# Patient Record
Sex: FEMALE | Race: WHITE
[De-identification: names, ages, dates, MRNs, and addresses within clinical notes are randomized per-mention and may not be internally consistent; named-entity substitution may affect disease eponyms.]

---

## 2017-01-04 ENCOUNTER — HOSPITAL ENCOUNTER (OUTPATIENT)
Dept: HOSPITAL 45 - C.LAB | Age: 77
Discharge: HOME | End: 2017-01-04
Payer: COMMERCIAL

## 2017-01-04 DIAGNOSIS — I10: Primary | ICD-10-CM

## 2017-01-04 DIAGNOSIS — M79.2: ICD-10-CM

## 2017-01-04 LAB
ALT SERPL-CCNC: 23 U/L (ref 12–78)
ANION GAP SERPL CALC-SCNC: 6 MMOL/L (ref 3–11)
AST SERPL-CCNC: 14 U/L (ref 15–37)
BASOPHILS # BLD: 0.02 K/UL (ref 0–0.2)
BASOPHILS NFR BLD: 0.4 %
BUN SERPL-MCNC: 16 MG/DL (ref 7–18)
BUN/CREAT SERPL: 16.5 (ref 10–20)
CALCIUM SERPL-MCNC: 9.1 MG/DL (ref 8.5–10.1)
CHLORIDE SERPL-SCNC: 107 MMOL/L (ref 98–107)
CO2 SERPL-SCNC: 29 MMOL/L (ref 21–32)
COMPLETE: YES
CREAT SERPL-MCNC: 0.96 MG/DL (ref 0.6–1.2)
EOSINOPHIL NFR BLD AUTO: 245 K/UL (ref 130–400)
GLUCOSE SERPL-MCNC: 96 MG/DL (ref 70–99)
HCT VFR BLD CALC: 39.1 % (ref 37–47)
IG%: 0.2 %
IMM GRANULOCYTES NFR BLD AUTO: 31.4 %
LYMPHOCYTES # BLD: 1.7 K/UL (ref 1.2–3.4)
MCH RBC QN AUTO: 31.5 PG (ref 25–34)
MCHC RBC AUTO-ENTMCNC: 34.3 G/DL (ref 32–36)
MCV RBC AUTO: 92 FL (ref 80–100)
MONOCYTES NFR BLD: 8.7 %
NEUTROPHILS # BLD AUTO: 7.7 %
NEUTROPHILS NFR BLD AUTO: 51.6 %
PMV BLD AUTO: 9.1 FL (ref 7.4–10.4)
POTASSIUM SERPL-SCNC: 4.1 MMOL/L (ref 3.5–5.1)
RBC # BLD AUTO: 4.25 M/UL (ref 4.2–5.4)
SODIUM SERPL-SCNC: 142 MMOL/L (ref 136–145)
WBC # BLD AUTO: 5.42 K/UL (ref 4.8–10.8)

## 2017-01-27 ENCOUNTER — HOSPITAL ENCOUNTER (OUTPATIENT)
Dept: HOSPITAL 45 - C.LAB | Age: 77
End: 2017-01-27
Payer: COMMERCIAL

## 2017-01-27 DIAGNOSIS — M79.1: Primary | ICD-10-CM

## 2017-01-27 LAB
FERRITIN SERPL-MCNC: 76.9 NG/ML (ref 8–388)
MAGNESIUM SERPL-MCNC: 2.2 MG/DL (ref 1.8–2.4)
TSH SERPL-ACNC: 1.63 UIU/ML (ref 0.3–4.5)

## 2017-05-08 ENCOUNTER — HOSPITAL ENCOUNTER (OUTPATIENT)
Dept: HOSPITAL 45 - C.RAD | Age: 77
Discharge: HOME | End: 2017-05-08
Payer: COMMERCIAL

## 2017-05-08 DIAGNOSIS — R05: Primary | ICD-10-CM

## 2017-05-08 LAB
BASOPHILS # BLD: 0.02 K/UL (ref 0–0.2)
BASOPHILS NFR BLD: 0.3 %
COMPLETE: YES
EOSINOPHIL NFR BLD AUTO: 188 K/UL (ref 130–400)
HCT VFR BLD CALC: 39.4 % (ref 37–47)
IG%: 0.3 %
IMM GRANULOCYTES NFR BLD AUTO: 20 %
LYMPHOCYTES # BLD: 1.44 K/UL (ref 1.2–3.4)
MCH RBC QN AUTO: 31.3 PG (ref 25–34)
MCHC RBC AUTO-ENTMCNC: 33.8 G/DL (ref 32–36)
MCV RBC AUTO: 92.7 FL (ref 80–100)
MONOCYTES NFR BLD: 9 %
NEUTROPHILS # BLD AUTO: 5.5 %
NEUTROPHILS NFR BLD AUTO: 64.9 %
PMV BLD AUTO: 8.7 FL (ref 7.4–10.4)
RBC # BLD AUTO: 4.25 M/UL (ref 4.2–5.4)
WBC # BLD AUTO: 7.21 K/UL (ref 4.8–10.8)

## 2017-06-28 ENCOUNTER — HOSPITAL ENCOUNTER (EMERGENCY)
Dept: HOSPITAL 45 - C.EDB | Age: 77
Discharge: HOME | End: 2017-06-28
Payer: COMMERCIAL

## 2017-06-28 VITALS
WEIGHT: 145.06 LBS | BODY MASS INDEX: 25.7 KG/M2 | WEIGHT: 145.06 LBS | HEIGHT: 62.99 IN | BODY MASS INDEX: 25.7 KG/M2 | HEIGHT: 62.99 IN

## 2017-06-28 VITALS — OXYGEN SATURATION: 97 % | DIASTOLIC BLOOD PRESSURE: 107 MMHG | SYSTOLIC BLOOD PRESSURE: 190 MMHG | HEART RATE: 100 BPM

## 2017-06-28 VITALS — TEMPERATURE: 98.06 F

## 2017-06-28 DIAGNOSIS — Z82.49: ICD-10-CM

## 2017-06-28 DIAGNOSIS — I10: ICD-10-CM

## 2017-06-28 DIAGNOSIS — Z88.1: ICD-10-CM

## 2017-06-28 DIAGNOSIS — Z83.3: ICD-10-CM

## 2017-06-28 DIAGNOSIS — Z88.5: ICD-10-CM
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
## 2017-06-28 DIAGNOSIS — S09.90XA: ICD-10-CM
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
## 2017-06-28 DIAGNOSIS — Z86.19: ICD-10-CM

## 2017-06-28 DIAGNOSIS — Z98.890: ICD-10-CM

## 2017-06-28 DIAGNOSIS — Z90.710: ICD-10-CM

## 2017-06-28 DIAGNOSIS — S01.01XA: Primary | ICD-10-CM

## 2017-06-28 DIAGNOSIS — Z79.899: ICD-10-CM

## 2017-06-28 NOTE — EMERGENCY ROOM VISIT NOTE
History


First contact with patient:  20:18


Chief Complaint:  HEAD INJURY (MINOR)


Stated Complaint:  CUT ON HEAD/INJURY





History of Present Illness


The patient is a 77 year old female who presents to the Emergency Room with 

complaints of a head injury which occurred just prior to arrival.  The patient 

states that she was ice skating with a [employer] group and fell on the ice, 

striking the back of her head on the ice.  There was no loss of consciousness.  

The patient reports a mild headache which she rates a 5/10.  She denies any 

nausea/vomiting, dizziness, confusion, blurred vision or slurred speech.  The 

patient does not take blood thinners.  She reports she did sustain a laceration 

to the back of the head.  Her tetanus shot is up-to-date.





Review of Systems


A complete 10 point review of systems was reviewed with the patient with 

pertinent positives and negatives as per history of present illness. All else 

were negative.





Past Medical/Surgical History


Medical Problems:


(1) Bronchitis


(2) HTN (hypertension)


(3) Pneumonia


Surgical Problems:


(1) H/O hernia repair


(2) H/O: hysterectomy








Family History





Diabetes mellitus


Heart disease


Hypertension





Social History


Smoking Status:  Never Smoker


Alcohol Use:  none


Marital Status:  


Housing Status:  lives with significant other


Occupation Status:  retired





Current/Historical Medications


Scheduled


Carbamazepine (Epitol), 100 MG PO HS


Carbamazepine (Tegretol), 200 MG PO QAM


Cholecalciferol (Vitamin D 400), 400 UNITS PO DAILY


Cyanocobalamin (Vitamin B-12), 1,000 MCG PO DAILY


Losartan Potassium (Cozaar), 50 MG PO DAILY


Raloxifene Hcl (Evista), 60 MG PO WK


Ranitidine Hcl (Zantac), 300 MG PO HS





Scheduled PRN


Ondansetron Hcl (Zofran), 4 MG PO Q6H PRN for Nausea





Allergies


Coded Allergies:  


     Amoxicillin (Verified  Allergy, Unknown, unknown, 5/27/15)


     Codeine (Verified  Adverse Reaction, Intermediate, DIZZINESS AND FAINTING, 

5/27/15)





Physical Exam


Vital Signs











  Date Time  Temp Pulse Resp B/P (MAP) Pulse Ox O2 Delivery O2 Flow Rate FiO2


 


6/28/17 22:12  100 20 190/107 97   


 


6/28/17 20:10 36.7 107 16 186/95 97 Room Air  











Physical Exam


VITALS: Vitals are noted on the nurse's note and reviewed by myself.  Vital 

signs stable.


GENERAL: This is a 77-year-old female, in no acute distress, nondiaphoretic, 

well-developed well-nourished.


SKIN: There is a 6 cm laceration to the posterior aspect of the scalp with no 

active bleeding.


EARS: External auditory canals clear, tympanic membranes pearly gray without 

erythema or effusion bilaterally.  No hemotympanum.


EYES: Pupils equal round and reactive to light and accommodation.  Conjunctivae 

without injection, sclerae without icterus.  Extraocular movements intact.  


MOUTH: No loose or chipped teeth.


NECK: Cervical spine is nontender.  


HEART: Regular rate and rhythm without murmurs gallops or rubs.


LUNGS: Clear to auscultation bilaterally without wheezes, rales or rhonchi. 


NEURO: Patient was alert and oriented to person place and time.   No focal 

neurological deficits.





Medical Decision & Procedures


ER Provider


Diagnostic Interpretation:


HEAD CT NONCONTRAST





Findings: Trace fluid within the left sphenoid sinus. Posterior scalp


laceration. The calvarium and skull base are intact. The ventricles and sulci


are within normal limits. There is no mass, hematoma, midline shift, or acute


infarct.





Impression:


No acute intracranial abnormality. Posterior scalp laceration.





Procedure


Verbal consent was obtained to perform the procedure.  Using sterile technique 

the wound was cleaned with Betadine.  The area was sterilely draped.  6 ml of 1

% buffered lidocaine with epinephrine was used to anesthetize the patient's 

scalp.  Once the patient was numb, the wound was copiously irrigated under 

pressure with sterile saline.  The wound was explored and there were no deep 

structures present.  The laceration was repaired using 11 staples with the 

wound edges being well approximated.  The patient tolerated the procedure well.

  The bleeding stopped.  The area was cleaned with sterile saline.





Medical Decision


Differential diagnosis includes skull fracture, intracranial bleed, concussion, 

among others.





The patient is a 77-year-old female who presents today for evaluation of a head 

injury.  The patient underwent CT of the head, which was read by radiology with 

no acute findings.  Scalp laceration repair was performed as noted in the 

procedure section.  Conservative measures and wound care instructions were 

discussed with the patient.  She verbalized her understanding and was 

discharged home in good condition.





The patient was independently evaluated by Dr. Huggins, ED attending 

physician, who agreed with my assessment and treatment plan.





Medication reconciliation:  I attest that I have personally reviewed the patient

's current medication list. 


Blood pressure screening:  Patient was found to have an elevated blood pressure 

and was referred to their primary care provider for recheck and further 

treatment.  Patient does note a history of hypertension and states that she 

typically takes losartan at bedtime and did not take that yet.





Impression





 Primary Impression:  


 Closed head injury


 Additional Impression:  


 Scalp laceration





Departure Information


Dispostion


Home / Self-Care





Condition


GOOD





Referrals


Artis Lao Jr,D.O. (PCP)





Patient Instructions


My [facility]





Additional Instructions





You have received 11 staples on your scalp. These staples are NOT dissolvable 

and WILL need to be removed by a health care provider in 10 days. You can 

return to the Emergency Department or contact your Primary Care Provider to 

have these staples removed.





Proper wound care is essential for adequate wound healing and infection 

prevention. You can shower and clean the wound with soap and water. Do scour 

over the wound, pat dry with a towel. Do not submerse the wound until the 

staples have been removed. You can use an antibiotic ointment with a dressing 

over the wound for the next 3-4 days. After this time you may leave the wound 

dry and open to the air. If crust develops over the wound you can use a Q-tip 

to apply a 1:1 peroxide:water solution to clean the wound.





Look for signs of infection of the wound including: increased pain, swelling, 

foul discharge, streaking, or increased temperature. If any of these are 

noticed you should return to the Emergency Department for further assessment 

and treatment.


   


As with any laceration you may have received nerve damage to the surrounding 

tissues. This damage may or may not be permanent.





For pain control, you can use the following over-the-counter medicines (if >11 yo):





- Regular strength (325mg/tab) Tylenol (acetaminophen) 2 tabs every 4-6 hours 

as needed. Do not exceed 12 tablets in a 24 hour period. Avoid taking more than 

4 grams (4000 mg) of Tylenol per day. This includes any other sources of 

acetaminophen you may take on a regular basis.





- Regular strength (200 mg/tab) Advil (ibuprofen) 1-2 tabs every 4-6 hours as 

needed. Do not exceed a dose of 3200 mg per day.





Return to the emergency department if your symptoms worsen despite treatment 

course outlined above.





Problem Qualifiers








 Primary Impression:  


 Closed head injury


 Encounter type:  initial encounter  Qualified Codes:  S09.90XA - Unspecified 

injury of head, initial encounter


 Additional Impression:  


 Scalp laceration


 Encounter type:  initial encounter  Qualified Codes:  S01.01XA - Laceration 

without foreign body of scalp, initial encounter

## 2017-06-28 NOTE — EMERGENCY ROOM VISIT NOTE
ED Visit Note


First contact with patient:  20:18


The patient was seen and examined with Gloria Zimmer PA-C.  I agree with the 

history, physical and findings.  Please see the note for disposition and 

details.

## 2017-06-28 NOTE — DIAGNOSTIC IMAGING REPORT
HEAD CT NONCONTRAST



CT DOSE: 537.48 mGy.cm



HISTORY:      head injury



TECHNIQUE: Multiaxial CT images of the head were performed without the use of

intravenous contrast. Automated exposure control was utilized for this study.



Comparison: None.



Findings: Trace fluid within the left sphenoid sinus. Posterior scalp

laceration. The calvarium and skull base are intact. The ventricles and sulci

are within normal limits. There is no mass, hematoma, midline shift, or acute

infarct.



Impression:

No acute intracranial abnormality. Posterior scalp laceration.







Electronically signed by:  Shelton Carrasquillo M.D.

6/28/2017 9:07 PM



Dictated Date/Time:  6/28/2017 9:01 PM

## 2017-08-14 ENCOUNTER — HOSPITAL ENCOUNTER (OUTPATIENT)
Dept: HOSPITAL 45 - C.LAB | Age: 77
Discharge: HOME | End: 2017-08-14
Payer: COMMERCIAL

## 2017-08-14 DIAGNOSIS — E78.5: ICD-10-CM

## 2017-08-14 DIAGNOSIS — E61.1: ICD-10-CM

## 2017-08-14 DIAGNOSIS — E55.9: ICD-10-CM

## 2017-08-14 DIAGNOSIS — G47.00: Primary | ICD-10-CM

## 2017-08-14 LAB
ANION GAP SERPL CALC-SCNC: 5 MMOL/L (ref 3–11)
BASOPHILS # BLD: 0.03 K/UL (ref 0–0.2)
BASOPHILS NFR BLD: 0.5 %
BUN SERPL-MCNC: 13 MG/DL (ref 7–18)
BUN/CREAT SERPL: 16.6 (ref 10–20)
CALCIUM SERPL-MCNC: 9 MG/DL (ref 8.5–10.1)
CHLORIDE SERPL-SCNC: 108 MMOL/L (ref 98–107)
CHOLEST/HDLC SERPL: 2.5 {RATIO}
CO2 SERPL-SCNC: 28 MMOL/L (ref 21–32)
COMPLETE: YES
CREAT SERPL-MCNC: 0.81 MG/DL (ref 0.6–1.2)
EOSINOPHIL NFR BLD AUTO: 242 K/UL (ref 130–400)
FERRITIN SERPL-MCNC: 91.8 NG/ML (ref 8–388)
GLUCOSE SERPL-MCNC: 87 MG/DL (ref 70–99)
GLUCOSE UR QL: 102 MG/DL
HCT VFR BLD CALC: 42.8 % (ref 37–47)
IG%: 0.2 %
IMM GRANULOCYTES NFR BLD AUTO: 31.1 %
KETONES UR QL STRIP: 127 MG/DL
LYMPHOCYTES # BLD: 1.94 K/UL (ref 1.2–3.4)
MCH RBC QN AUTO: 29.9 PG (ref 25–34)
MCHC RBC AUTO-ENTMCNC: 32.2 G/DL (ref 32–36)
MCV RBC AUTO: 92.6 FL (ref 80–100)
MONOCYTES NFR BLD: 7.9 %
NEUTROPHILS # BLD AUTO: 3.5 %
NEUTROPHILS NFR BLD AUTO: 56.8 %
NITRITE UR QL STRIP: 119 MG/DL (ref 0–150)
PH UR: 253 MG/DL (ref 0–200)
PMV BLD AUTO: 9.4 FL (ref 7.4–10.4)
POTASSIUM SERPL-SCNC: 4.1 MMOL/L (ref 3.5–5.1)
RBC # BLD AUTO: 4.62 M/UL (ref 4.2–5.4)
SODIUM SERPL-SCNC: 141 MMOL/L (ref 136–145)
VERY LOW DENSITY LIPOPROT CALC: 24 MG/DL
WBC # BLD AUTO: 6.23 K/UL (ref 4.8–10.8)

## 2017-12-06 ENCOUNTER — HOSPITAL ENCOUNTER (OUTPATIENT)
Dept: HOSPITAL 45 - C.MAMM | Age: 77
Discharge: HOME | End: 2017-12-06
Attending: OBSTETRICS & GYNECOLOGY
Payer: COMMERCIAL

## 2017-12-06 DIAGNOSIS — Z12.31: Primary | ICD-10-CM

## 2017-12-07 NOTE — MAMMOGRAPHY REPORT
BILATERAL DIGITAL SCREENING MAMMOGRAM TOMOSYNTHESIS WITH CAD: 12/6/2017

CLINICAL HISTORY: Routine screening.  Patient has no complaints.  





TECHNIQUE:  Breast tomosynthesis in addition to standard 2D mammography was performed. Current study 
was also evaluated with a Computer Aided Detection (CAD) system.  



COMPARISON: Comparison is made to exams dated:  10/18/2016 mammogram, 10/15/2015 mammogram, 7/14/2014
 mammogram, 7/11/2013 mammogram, 6/5/2012 mammogram, and 5/31/2011 mammogram - [facility].   



BREAST COMPOSITION:  There are scattered areas of fibroglandular density in both breasts.  



FINDINGS:  There is a 7 mm focal asymmetry in the upper outer middle one third of the right breast, f
or which additional spot compression tomosynthesis views and possible ultrasound are recommended.



There are scattered benign round microcalcifications in both breasts. No other suspicious mass, archi
tectural distortion or cluster of microcalcifications is seen.  



IMPRESSION:  ACR BI-RADS CATEGORY 0: INCOMPLETE EVALUATION:  NEED ADDITIONAL IMAGING EVALUATION

The 7 mm focal asymmetry in the right upper outer breast needs additional evaluation.  

The patient will be called to schedule an appointment.  





Approximately 10% of breast cancers are not detected with mammography. A negative mammographic report
 should not delay biopsy if a clinically suggestive mass is present.



Julieth Fall M.D.          

ay/:12/6/2017 16:28:17  



Imaging Technologist: Ivelisse HOLM)([place]), [facility]

letter sent: Addl Imaging 0  

BI-RADS Code: ACR BI-RADS Category 0: Incomplete Evaluation:  Need Additional Imaging Evaluation

## 2017-12-18 ENCOUNTER — HOSPITAL ENCOUNTER (OUTPATIENT)
Dept: HOSPITAL 45 - C.MAMM | Age: 77
Discharge: HOME | End: 2017-12-18
Attending: OBSTETRICS & GYNECOLOGY
Payer: COMMERCIAL

## 2017-12-18 DIAGNOSIS — N64.89: Primary | ICD-10-CM

## 2017-12-18 DIAGNOSIS — N63.10: ICD-10-CM

## 2017-12-18 NOTE — MAMMOGRAPHY REPORT
UNILATERAL RIGHT DIGITAL DIAGNOSTIC MAMMOGRAM TOMOSYNTHESIS AND TARGETED RIGHT ULTRASOUND: 12/18/2017


CLINICAL HISTORY: 77-year-old woman called back from screening mammography for a 7 mm focal asymmetry
 in the upper outer middle one third of the right breast.  





TECHNIQUE: Spot compression 2-D and tomosynthesis CC and MLO views of the right breast were obtained.




COMPARISON: Comparison is made to exams dated:  12/6/2017 mammogram, 10/18/2016 mammogram, 10/15/2015
 mammogram, 7/14/2014 mammogram, 7/11/2013 mammogram, and 5/31/2011 mammogram - [facility].   



BREAST COMPOSITION:  There are scattered areas of fibroglandular density in the right breast.  



FINDINGS:  There is partial effacement of the focal asymmetry in the upper outer middle one third of 
the right breast on the additional supplemental spot compression tomosynthesis views.  Possible persi
stent 4 mm partially circumscribed mass is seen in the superior middle one third of the right breast 
on the spot compression MLO view (tomosynthesis slice 15/61) another circumscribed and lobulated low-
density mass is seen in the upper outer posterior right breast measuring 7 x 5 x 5 mm which has been 
stable on all available prior mammograms, most likely representing an intramammary lymph node.  No fo
braxton area of architectural distortion or suspicious calcifications are seen.



Targeted ultrasound was performed throughout the upper outer quadrant of the right breast.  In the 9:
00 axis, 4 cm from the nipple, there is an oval hypoechoic partially circumscribed possibly microlobu
lated mass measuring 3.8 x 2.8 x 3.7 mm.  Although this could represent a complicated cyst, a solid m
ass cannot be excluded and definitive characterization with ultrasound guided cyst aspiration versus 
core biopsy is recommended.  This may correspond to the initial focal mammographic asymmetry.  In the
 9:30 right breast, 7 cm from the nipple, a morphologically normal intramammary lymph node is seen me
asuring 7 mm, corresponding to the second mammographic mass in the upper outer posterior breast which
 has been stable on numerous prior mammograms.



IMPRESSION:  ACR BI-RADS CATEGORY 4: SUSPICIOUS, TARGETED ULTRASOUND ACR BI-RADS CATEGORY 4: SUSPICIO
US 

1.  There is an indeterminate 3.8 mm hypoechoic cystic versus solid mass in the 9:00 right breast on 
ultrasound, which may correlate with the mammographic focal asymmetry seen on recent screening mammog
jaren in the right upper outer quadrant.  Definitive characterization with ultrasound guided cyst asp
iration versus core needle biopsy is recommended.



These results and recommendations were discussed with the patient at the time of the exam.









Approximately 10% of breast cancers are not detected with mammography. A negative mammographic report
 should not delay biopsy if a clinically suggestive mass is present.



Julieth Fall M.D.          

ay/:12/18/2017 12:26:13  



Imaging Technologist: Mariana Cisse, [facility]

letter sent: Abnormal 4/5  

BI-RADS Code: ACR BI-RADS Category 4: Suspicious  Ultrasound BI-RADS: ACR BI-RADS Category 4: Suspici
ous

## 2017-12-20 ENCOUNTER — HOSPITAL ENCOUNTER (OUTPATIENT)
Dept: HOSPITAL 45 - C.MAMM | Age: 77
Discharge: HOME | End: 2017-12-20
Attending: OBSTETRICS & GYNECOLOGY
Payer: COMMERCIAL

## 2017-12-20 DIAGNOSIS — R92.8: Primary | ICD-10-CM

## 2017-12-20 DIAGNOSIS — N63.10: ICD-10-CM

## 2017-12-20 NOTE — MAMMOGRAPHY REPORT
UNILATERAL RIGHT DIGITAL DIAGNOSTIC MAMMOGRAM TOMOSYNTHESIS: 12/20/2017

CLINICAL HISTORY: Status post ultrasound guided cyst aspiration of an oval hypoechoic to anechoic sub
-centimeter mass in the 9:00 right breast.  



Please refer to the report from right breast ultrasound guided cyst aspiration performed at the same 
time for full detail.



IMPRESSION:  

Please refer to the report from right breast ultrasound guided cyst aspiration performed at the same 
time for full detail.



Approximately 10% of breast cancers are not detected with mammography. A negative mammographic report
 should not delay biopsy if a clinically suggestive mass is present.



Julieth Fall M.D.          

ay/:12/20/2017 13:09:33  



Imaging Technologist: Mariana Cisse, [facility]



BI-RADS Code: n/a

## 2017-12-21 NOTE — MAMMOGRAPHY REPORT
ASPIRATION RIGHT BREAST: 12/20/2017

CLINICAL HISTORY: 77-year-old woman called back from recent screening mammogram for a 7 mm focal asym
metry in the upper outer middle one third of the right breast.  Additional diagnostic tomosynthesis v
iews and ultrasound demonstrated an oval mass, which was hypoechoic to anechoic on ultrasound, possib
ly representing a cyst.  Patient presents for ultrasound guided cyst aspiration versus core needle bi
opsy.  



COMPARISON: Comparison is made to exams dated:  12/18/2017 ultrasound, 12/18/2017 mammogram, 12/6/201
7 mammogram, 10/18/2016 mammogram, 10/15/2015 mammogram, and 7/14/2014 mammogram - [facility].



PATIENT CONSENT: After explaining the risks, benefits and alternatives of the procedure to the patien
t, informed consent was obtained verbally and in writing. Specific risks include: bleeding, infection
 and puncture of adjacent structure. A time out was preformed and the right breast was agreed as the 
site for cyst aspiration.  



PROCEDURE DESCRIPTION: The hypoechoic cystic appearing mass in the 9:00 right breast was identified. 
1% buffered lidocaine was administered subcutaneously and intraparenchymally as local anesthesia. A 2
2 gauge needle was advanced to the site of the mass. Aspiration was preformed and the cyst nearly com
pletely resolved. The fluid was reddish and clear, therefore rinsed in CytoLyt and sent to the pathol
ogy department for cytologic analysis.



Post aspiration right CC and ML 2-D and tomosynthesis images were obtained.  The 7 mm ovoid asymmetry
 is less conspicuous on the right CC 2-D and tomosynthesis postprocedure mammogram, confirming mammog
raphicsonographic correlation and resolution of the aspirated cyst.



IMPRESSION: ASPIRATION

Status post aspiration in to near complete resolution of a probable benign cyst in the 9:00 right ericka
ast, which correlated with the 7 mm mammographic asymmetry in the right upper outer quadrant.



The patient will receive notification of the cytology results from her referring physician.





Julieth Fall M.D.  

ay/:12/20/2017 14:02:24  



Imaging Technologist: Mariana Cisse, [facility]

## 2018-01-22 ENCOUNTER — HOSPITAL ENCOUNTER (OUTPATIENT)
Dept: HOSPITAL 45 - C.MAMM | Age: 78
Discharge: HOME | End: 2018-01-22
Payer: COMMERCIAL

## 2018-01-22 DIAGNOSIS — M85.852: ICD-10-CM

## 2018-01-22 DIAGNOSIS — M85.88: Primary | ICD-10-CM

## 2018-01-22 DIAGNOSIS — M85.851: ICD-10-CM

## 2018-02-22 ENCOUNTER — HOSPITAL ENCOUNTER (OUTPATIENT)
Dept: HOSPITAL 45 - C.LAB | Age: 78
Discharge: HOME | End: 2018-02-22
Attending: INTERNAL MEDICINE
Payer: COMMERCIAL

## 2018-02-22 DIAGNOSIS — M81.0: Primary | ICD-10-CM

## 2018-02-22 LAB
BUN SERPL-MCNC: 19 MG/DL (ref 7–18)
CALCIUM SERPL-MCNC: 9.3 MG/DL (ref 8.5–10.1)
CO2 SERPL-SCNC: 28 MMOL/L (ref 21–32)
CREAT SERPL-MCNC: 0.91 MG/DL (ref 0.6–1.2)
GLUCOSE SERPL-MCNC: 89 MG/DL (ref 70–99)
POTASSIUM SERPL-SCNC: 4.2 MMOL/L (ref 3.5–5.1)
SODIUM SERPL-SCNC: 139 MMOL/L (ref 136–145)

## 2018-04-12 ENCOUNTER — HOSPITAL ENCOUNTER (OUTPATIENT)
Dept: HOSPITAL 45 - C.LAB | Age: 78
Discharge: HOME | End: 2018-04-12
Payer: COMMERCIAL

## 2018-04-12 DIAGNOSIS — I10: ICD-10-CM

## 2018-04-12 DIAGNOSIS — M79.2: Primary | ICD-10-CM

## 2018-04-12 DIAGNOSIS — E61.1: ICD-10-CM

## 2018-04-12 LAB
BASOPHILS # BLD: 0.02 K/UL (ref 0–0.2)
BASOPHILS NFR BLD: 0.4 %
EOS ABS #: 0.13 K/UL (ref 0–0.5)
EOSINOPHIL NFR BLD AUTO: 236 K/UL (ref 130–400)
HCT VFR BLD CALC: 39.9 % (ref 37–47)
HGB BLD-MCNC: 13.8 G/DL (ref 12–16)
IG#: 0 K/UL (ref 0–0.02)
IMM GRANULOCYTES NFR BLD AUTO: 35.5 %
LYMPHOCYTES # BLD: 1.93 K/UL (ref 1.2–3.4)
MCH RBC QN AUTO: 31.7 PG (ref 25–34)
MCHC RBC AUTO-ENTMCNC: 34.6 G/DL (ref 32–36)
MCV RBC AUTO: 91.5 FL (ref 80–100)
MONO ABS #: 0.41 K/UL (ref 0.11–0.59)
MONOCYTES NFR BLD: 7.6 %
NEUT ABS #: 2.94 K/UL (ref 1.4–6.5)
NEUTROPHILS # BLD AUTO: 2.4 %
NEUTROPHILS NFR BLD AUTO: 54.1 %
PMV BLD AUTO: 9 FL (ref 7.4–10.4)
RED CELL DISTRIBUTION WIDTH CV: 14 % (ref 11.5–14.5)
RED CELL DISTRIBUTION WIDTH SD: 47.3 FL (ref 36.4–46.3)
TRANSFERRIN SERPL-MCNC: 197 MG/DL (ref 200–360)
WBC # BLD AUTO: 5.43 K/UL (ref 4.8–10.8)

## 2018-08-07 ENCOUNTER — HOSPITAL ENCOUNTER (OUTPATIENT)
Dept: HOSPITAL 45 - C.LAB | Age: 78
Discharge: HOME | End: 2018-08-07
Payer: COMMERCIAL

## 2018-08-07 DIAGNOSIS — I10: Primary | ICD-10-CM

## 2018-08-07 DIAGNOSIS — E55.9: ICD-10-CM

## 2018-08-07 DIAGNOSIS — M79.2: ICD-10-CM

## 2018-08-07 LAB
ALT SERPL-CCNC: 19 U/L (ref 12–78)
AST SERPL-CCNC: 13 U/L (ref 15–37)
BASOPHILS # BLD: 0.02 K/UL (ref 0–0.2)
BASOPHILS NFR BLD: 0.4 %
BUN SERPL-MCNC: 13 MG/DL (ref 7–18)
CALCIUM SERPL-MCNC: 8.3 MG/DL (ref 8.5–10.1)
CO2 SERPL-SCNC: 27 MMOL/L (ref 21–32)
CREAT SERPL-MCNC: 0.95 MG/DL (ref 0.6–1.2)
EOS ABS #: 0.16 K/UL (ref 0–0.5)
EOSINOPHIL NFR BLD AUTO: 225 K/UL (ref 130–400)
GLUCOSE SERPL-MCNC: 90 MG/DL (ref 70–99)
HCT VFR BLD CALC: 39.6 % (ref 37–47)
HGB BLD-MCNC: 13.4 G/DL (ref 12–16)
IG#: 0.01 K/UL (ref 0–0.02)
IMM GRANULOCYTES NFR BLD AUTO: 34.4 %
KETONES UR QL STRIP: 108 MG/DL
LYMPHOCYTES # BLD: 1.86 K/UL (ref 1.2–3.4)
MCH RBC QN AUTO: 31.2 PG (ref 25–34)
MCHC RBC AUTO-ENTMCNC: 33.8 G/DL (ref 32–36)
MCV RBC AUTO: 92.1 FL (ref 80–100)
MONO ABS #: 0.43 K/UL (ref 0.11–0.59)
MONOCYTES NFR BLD: 7.9 %
NEUT ABS #: 2.93 K/UL (ref 1.4–6.5)
NEUTROPHILS # BLD AUTO: 3 %
NEUTROPHILS NFR BLD AUTO: 54.1 %
PH UR: 214 MG/DL (ref 0–200)
PMV BLD AUTO: 9.3 FL (ref 7.4–10.4)
POTASSIUM SERPL-SCNC: 4.3 MMOL/L (ref 3.5–5.1)
RED CELL DISTRIBUTION WIDTH CV: 14.1 % (ref 11.5–14.5)
RED CELL DISTRIBUTION WIDTH SD: 47.5 FL (ref 36.4–46.3)
SODIUM SERPL-SCNC: 138 MMOL/L (ref 136–145)
WBC # BLD AUTO: 5.41 K/UL (ref 4.8–10.8)